# Patient Record
Sex: FEMALE | Race: WHITE | NOT HISPANIC OR LATINO | Employment: FULL TIME | URBAN - METROPOLITAN AREA
[De-identification: names, ages, dates, MRNs, and addresses within clinical notes are randomized per-mention and may not be internally consistent; named-entity substitution may affect disease eponyms.]

---

## 2021-12-07 ENCOUNTER — APPOINTMENT (OUTPATIENT)
Dept: RADIOLOGY | Facility: CLINIC | Age: 41
End: 2021-12-07
Payer: COMMERCIAL

## 2021-12-07 ENCOUNTER — OFFICE VISIT (OUTPATIENT)
Dept: URGENT CARE | Facility: CLINIC | Age: 41
End: 2021-12-07
Payer: COMMERCIAL

## 2021-12-07 VITALS
HEIGHT: 63 IN | BODY MASS INDEX: 35.44 KG/M2 | RESPIRATION RATE: 22 BRPM | WEIGHT: 200 LBS | HEART RATE: 65 BPM | OXYGEN SATURATION: 98 % | TEMPERATURE: 96.9 F

## 2021-12-07 DIAGNOSIS — R51.9 ACUTE INTRACTABLE HEADACHE, UNSPECIFIED HEADACHE TYPE: ICD-10-CM

## 2021-12-07 DIAGNOSIS — R06.02 SOB (SHORTNESS OF BREATH): ICD-10-CM

## 2021-12-07 DIAGNOSIS — R05.9 COUGH: Primary | ICD-10-CM

## 2021-12-07 PROCEDURE — 0241U HB NFCT DS VIR RESP RNA 4 TRGT: CPT | Performed by: PHYSICIAN ASSISTANT

## 2021-12-07 PROCEDURE — G0383 LEV 4 HOSP TYPE B ED VISIT: HCPCS | Performed by: PHYSICIAN ASSISTANT

## 2021-12-07 PROCEDURE — 71046 X-RAY EXAM CHEST 2 VIEWS: CPT

## 2021-12-07 RX ORDER — BROMPHENIRAMINE MALEATE, PSEUDOEPHEDRINE HYDROCHLORIDE, AND DEXTROMETHORPHAN HYDROBROMIDE 2; 30; 10 MG/5ML; MG/5ML; MG/5ML
5 SYRUP ORAL 4 TIMES DAILY PRN
Qty: 140 ML | Refills: 0 | Status: SHIPPED | OUTPATIENT
Start: 2021-12-07 | End: 2021-12-14

## 2021-12-07 RX ORDER — ALBUTEROL SULFATE 90 UG/1
2 AEROSOL, METERED RESPIRATORY (INHALATION) EVERY 6 HOURS PRN
Qty: 8 G | Refills: 0 | Status: SHIPPED | OUTPATIENT
Start: 2021-12-07 | End: 2021-12-14

## 2021-12-08 LAB
FLUAV RNA RESP QL NAA+PROBE: NEGATIVE
FLUBV RNA RESP QL NAA+PROBE: NEGATIVE
RSV RNA RESP QL NAA+PROBE: NEGATIVE
SARS-COV-2 RNA RESP QL NAA+PROBE: NEGATIVE

## 2024-09-13 ENCOUNTER — APPOINTMENT (OUTPATIENT)
Dept: URGENT CARE | Facility: CLINIC | Age: 44
End: 2024-09-13

## 2025-01-28 ENCOUNTER — OFFICE VISIT (OUTPATIENT)
Dept: URGENT CARE | Facility: CLINIC | Age: 45
End: 2025-01-28
Payer: COMMERCIAL

## 2025-01-28 VITALS
WEIGHT: 190 LBS | TEMPERATURE: 98.8 F | DIASTOLIC BLOOD PRESSURE: 86 MMHG | OXYGEN SATURATION: 98 % | HEART RATE: 84 BPM | BODY MASS INDEX: 33.66 KG/M2 | SYSTOLIC BLOOD PRESSURE: 132 MMHG

## 2025-01-28 DIAGNOSIS — H65.192 OTHER NON-RECURRENT ACUTE NONSUPPURATIVE OTITIS MEDIA OF LEFT EAR: Primary | ICD-10-CM

## 2025-01-28 DIAGNOSIS — R68.89 FLU-LIKE SYMPTOMS: ICD-10-CM

## 2025-01-28 PROBLEM — F32.A DEPRESSION: Status: ACTIVE | Noted: 2021-03-16

## 2025-01-28 PROBLEM — Z12.4 SCREENING FOR CERVICAL CANCER: Status: ACTIVE | Noted: 2021-04-19

## 2025-01-28 PROBLEM — I10 PRIMARY HYPERTENSION: Status: ACTIVE | Noted: 2022-04-27

## 2025-01-28 PROBLEM — Z30.431 ENCOUNTER FOR ROUTINE CHECKING OF INTRAUTERINE CONTRACEPTIVE DEVICE (IUD): Status: ACTIVE | Noted: 2021-06-11

## 2025-01-28 PROBLEM — E78.5 HYPERLIPIDEMIA: Status: ACTIVE | Noted: 2022-01-20

## 2025-01-28 PROBLEM — F17.200 CURRENT SMOKER: Status: ACTIVE | Noted: 2021-04-19

## 2025-01-28 PROBLEM — R19.5 LOOSE STOOLS: Status: ACTIVE | Noted: 2021-04-19

## 2025-01-28 PROBLEM — E66.01 OBESITY, MORBID (HCC): Status: ACTIVE | Noted: 2022-01-20

## 2025-01-28 PROBLEM — A59.01 TRICHOMONAL VULVOVAGINITIS: Status: ACTIVE | Noted: 2021-06-11

## 2025-01-28 PROBLEM — Z00.01 ENCOUNTER FOR HEALTH MAINTENANCE EXAMINATION WITH ABNORMAL FINDINGS: Status: ACTIVE | Noted: 2021-04-19

## 2025-01-28 PROBLEM — Z13.31 NEGATIVE DEPRESSION SCREENING: Status: ACTIVE | Noted: 2021-04-19

## 2025-01-28 PROCEDURE — G0382 LEV 3 HOSP TYPE B ED VISIT: HCPCS

## 2025-01-28 PROCEDURE — 87636 SARSCOV2 & INF A&B AMP PRB: CPT

## 2025-01-28 RX ORDER — AMOXICILLIN 875 MG/1
875 TABLET, COATED ORAL 2 TIMES DAILY
Qty: 14 TABLET | Refills: 0 | Status: SHIPPED | OUTPATIENT
Start: 2025-01-28 | End: 2025-02-04

## 2025-01-28 RX ORDER — FEXOFENADINE HCL 60 MG/1
60 TABLET, FILM COATED ORAL
COMMUNITY

## 2025-01-28 NOTE — PATIENT INSTRUCTIONS
Take antibiotics as directed for next 7 days, complete entire course even if feeling better and take medication with food to avoid upset stomach. Continue over-the-counter products for other symptoms: tylenol for fevers, ibuprofen for body aches, flonase (fluticasone) with nasal saline and zyrtec/allegra/claritin (without D) for congestion, robitussin/coricidin/mucinex for cough, and airborne/emergen-c for vitamin supplementation. Follow-up with PCP in 3-5 days if no improvement of symptoms. Report to ER sooner if symptoms worsen.

## 2025-01-28 NOTE — PROGRESS NOTES
[facility]        NAME: Luz Elena Fabian is a 44 y.o. female  : 1980    MRN: [id number]  DATE: 2025  TIME: 10:56 AM    Assessment and Plan   Other non-recurrent acute nonsuppurative otitis media of left ear [H65.192]  1. Other non-recurrent acute nonsuppurative otitis media of left ear  amoxicillin (AMOXIL) 875 mg tablet      2. Flu-like symptoms  Covid/Flu- Office Collect Normal        COVID/flu PCR sent to lab, will f/u if positive. Patient declining Tamiflu at this time. Antibiotics as directed for ear infection. VSS in clinic, appears in no acute distress. Educated on use of OTC products for additional relief of symptoms. Advised close follow-up with PCP or to report to the ER if symptoms worsen. Patient verbalizes understanding and agreeable to plan. Work note provided.      Patient Instructions     Take antibiotics as directed for next 7 days, complete entire course even if feeling better and take medication with food to avoid upset stomach. Continue over-the-counter products for other symptoms: tylenol for fevers, ibuprofen for body aches, flonase (fluticasone) with nasal saline and zyrtec/allegra/claritin (without D) for congestion, robitussin/coricidin/mucinex for cough, and airborne/emergen-c for vitamin supplementation. Follow-up with PCP in 3-5 days if no improvement of symptoms. Report to ER sooner if symptoms worsen.      Chief Complaint     Chief Complaint   Patient presents with    Cold Like Symptoms     Fevers, Chills which started last night, kept waking up throughout the night, with body aches along with the chills. Crown of head felt like the sinus pressure, along the eyebrows, patient was able to bring some mucus up and it was green/thick. Patient felt as if she could taste the mucus. Only thing she took was a sinus cough medicine to alleviate the headache. 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          History of Present Illness        44 year old female presents for evaluation of fevers, chills, cough, and congestion x 1 day. She denies any direct sick contacts but does work as a . She denies h/o asthma or allergies. She is not vaccinated against COVID or flu for this season. She reports left sided ear pain yesterday but feels it has improved. She denies tinnitus, hearing loss, or ear discharge. She denies sore throat, productive sputum, or SOB. She has tried OTC decongestants with minimal symptom relief.    URI   This is a new problem. The current episode started in the past 7 days. The problem has been gradually improving. There has been no fever. Associated symptoms include congestion, coughing, ear pain and rhinorrhea. Pertinent negatives include no abdominal pain, chest pain, diarrhea, dysuria, headaches, joint pain, joint swelling, nausea, neck pain, plugged ear sensation, rash, sinus pain, sneezing, sore throat, swollen glands, vomiting or wheezing. She has tried decongestant for the symptoms. The treatment provided mild relief.       Review of Systems   Review of Systems   Constitutional:  Positive for activity change, chills, fatigue and fever. Negative for appetite change.   HENT:  Positive for congestion, ear pain, postnasal drip and rhinorrhea. Negative for sinus pressure, sinus pain, sneezing, sore throat and trouble swallowing.    Eyes:  Negative for visual disturbance.   Respiratory:  Positive for cough. Negative for chest tightness, shortness of breath and wheezing.    Cardiovascular:  Negative for chest pain and palpitations.   Gastrointestinal:  Negative for abdominal pain, constipation, diarrhea, nausea and vomiting.   Genitourinary:  Negative for dysuria.   Musculoskeletal:  Positive for myalgias. Negative for arthralgias, back pain, joint pain and neck pain.   Skin:  Negative for color change, pallor and rash.   Allergic/Immunologic: Negative for environmental allergies and food allergies.    Neurological:  Negative for dizziness, light-headedness and headaches.         Current Medications       Current Outpatient Medications:     amoxicillin (AMOXIL) 875 mg tablet, Take 1 tablet (875 mg total) by mouth 2 (two) times a day for 7 days, Disp: 14 tablet, Rfl: 0    buPROPion HCl (WELLBUTRIN PO), Take by mouth, Disp: , Rfl:     fexofenadine (ALLEGRA) 60 MG tablet, Take 60 mg by mouth, Disp: , Rfl:     Levonorgestrel (MIRENA) 20 MCG/DAY IUD, 1 each by Intrauterine route, Disp: , Rfl:     Current Allergies     Allergies as of 2025    (No Known Allergies)            The following portions of the patient's history were reviewed and updated as appropriate: allergies, current medications, past family history, past medical history, past social history, past surgical history and problem list.     History reviewed. No pertinent past medical history.    Past Surgical History:   Procedure Laterality Date     SECTION      x1       No family history on file.      Medications have been verified.        Objective   /86 (Patient Position: Sitting, Cuff Size: Standard)   Pulse 84   Temp 98.8 °F (37.1 °C) (Oral)   Wt 86.2 kg (190 lb)   SpO2 98%   BMI 33.66 kg/m²        Physical Exam     Physical Exam  Vitals and nursing note reviewed.   Constitutional:       General: She is awake. She is not in acute distress.     Appearance: Normal appearance. She is overweight.   HENT:      Head: Normocephalic and atraumatic.      Right Ear: Hearing, tympanic membrane, ear canal and external ear normal.      Left Ear: Hearing and external ear normal. Tympanic membrane is erythematous and bulging. Tympanic membrane has decreased mobility.      Nose: Congestion and rhinorrhea present. Rhinorrhea is clear.      Right Turbinates: Enlarged. Not swollen or pale.      Left Turbinates: Enlarged. Not swollen or pale.      Right Sinus: No maxillary sinus tenderness or frontal sinus tenderness.      Left Sinus: No maxillary  sinus tenderness or frontal sinus tenderness.      Mouth/Throat:      Lips: Pink.      Mouth: Mucous membranes are moist.      Pharynx: Oropharynx is clear. Uvula midline. Posterior oropharyngeal erythema and postnasal drip present. No oropharyngeal exudate.      Tonsils: No tonsillar exudate or tonsillar abscesses. 2+ on the right. 2+ on the left.   Eyes:      Conjunctiva/sclera: Conjunctivae normal.   Cardiovascular:      Rate and Rhythm: Normal rate and regular rhythm.      Pulses: Normal pulses.      Heart sounds: Normal heart sounds.   Pulmonary:      Effort: Pulmonary effort is normal.      Breath sounds: Normal breath sounds.   Musculoskeletal:      Cervical back: Full passive range of motion without pain, normal range of motion and neck supple.   Lymphadenopathy:      Cervical: No cervical adenopathy.   Skin:     General: Skin is warm and dry.   Neurological:      General: No focal deficit present.      Mental Status: She is alert and oriented to person, place, and time.   Psychiatric:         Mood and Affect: Mood normal.         Behavior: Behavior normal. Behavior is cooperative.         Thought Content: Thought content normal.         Judgment: Judgment normal.

## 2025-01-28 NOTE — LETTER
January 28, 2025     Patient: Luz Elena Fabian   YOB: 1980   Date of Visit: 1/28/2025       To Whom it May Concern:    Luz Elena Fabian was seen in my clinic on 1/28/2025. She may return to work on 1/30/2025 .    If you have any questions or concerns, please don't hesitate to call.         Sincerely,          GUILLERMO Up        CC: No Recipients

## 2025-01-29 LAB
FLUAV RNA RESP QL NAA+PROBE: NEGATIVE
FLUBV RNA RESP QL NAA+PROBE: NEGATIVE
SARS-COV-2 RNA RESP QL NAA+PROBE: POSITIVE